# Patient Record
Sex: MALE | Race: WHITE | NOT HISPANIC OR LATINO | Employment: FULL TIME | URBAN - METROPOLITAN AREA
[De-identification: names, ages, dates, MRNs, and addresses within clinical notes are randomized per-mention and may not be internally consistent; named-entity substitution may affect disease eponyms.]

---

## 2024-04-18 ENCOUNTER — OFFICE VISIT (OUTPATIENT)
Dept: URGENT CARE | Facility: CLINIC | Age: 39
End: 2024-04-18
Payer: COMMERCIAL

## 2024-04-18 VITALS
OXYGEN SATURATION: 100 % | SYSTOLIC BLOOD PRESSURE: 138 MMHG | TEMPERATURE: 97.9 F | DIASTOLIC BLOOD PRESSURE: 74 MMHG | HEART RATE: 68 BPM | RESPIRATION RATE: 18 BRPM | WEIGHT: 184.4 LBS

## 2024-04-18 DIAGNOSIS — Z75.8 DOES NOT HAVE PRIMARY CARE PROVIDER: ICD-10-CM

## 2024-04-18 DIAGNOSIS — M79.602 LEFT ARM PAIN: Primary | ICD-10-CM

## 2024-04-18 DIAGNOSIS — Z82.49 FAMILY HISTORY OF CARDIAC DISORDER: ICD-10-CM

## 2024-04-18 DIAGNOSIS — E78.1 HIGH TRIGLYCERIDES: ICD-10-CM

## 2024-04-18 DIAGNOSIS — Z86.79 PERSONAL HISTORY OF PRIMARY HYPERTENSION: ICD-10-CM

## 2024-04-18 DIAGNOSIS — F43.9 STRESS: ICD-10-CM

## 2024-04-18 LAB
ATRIAL RATE: 68 BPM
P AXIS: -24 DEGREES
PR INTERVAL: 150 MS
QRS AXIS: 19 DEGREES
QRSD INTERVAL: 80 MS
QT INTERVAL: 382 MS
QTC INTERVAL: 406 MS
T WAVE AXIS: -3 DEGREES
VENTRICULAR RATE: 68 BPM

## 2024-04-18 PROCEDURE — 99215 OFFICE O/P EST HI 40 MIN: CPT | Performed by: NURSE PRACTITIONER

## 2024-04-18 PROCEDURE — 93010 ELECTROCARDIOGRAM REPORT: CPT | Performed by: NURSE PRACTITIONER

## 2024-04-18 PROCEDURE — 93005 ELECTROCARDIOGRAM TRACING: CPT | Performed by: NURSE PRACTITIONER

## 2024-04-18 NOTE — PATIENT INSTRUCTIONS
You are being sent to the ED for a higher level of care and evaluation as per your concern for cardiac disease related to recent stress and left arm pain with personal hx of high blood pressure and high try glycerides and family cardiac disease   You have been given a referral for PCP - call for appointment after ED visit

## 2024-04-18 NOTE — PROGRESS NOTES
[facility]        NAME: Skyler Brunner is a 38 y.o. male  : 1985    MRN: [id number]  DATE: 2024  TIME: 10:59 AM    Assessment and Plan   Left arm pain [M79.602]  1. Left arm pain  ECG 12 lead    Ambulatory Referral to Family Practice    Transfer to other facility      2. Stress  Ambulatory Referral to Family Practice    Transfer to other facility      3. Family history of cardiac disorder  Ambulatory Referral to Family Practice    Transfer to other facility      4. High triglycerides  Ambulatory Referral to Family Practice    Transfer to other facility    personal history of      5. Personal history of primary hypertension  Ambulatory Referral to Family Practice    Transfer to other facility      6. Does not have primary care provider  Ambulatory Referral to Family Practice    Transfer to other facility            Patient Instructions       Follow up with PCP in 3-5 days.  Proceed to  ER if symptoms worsen.    If tests have been performed at [facility], our office will contact you with results if changes need to be made to the care plan discussed with you at the visit.  You can review your full results on [facility] MyChart.      You are being sent to the ED for a higher level of care and evaluation as per your concern for cardiac disease related to recent stress and left arm pain with personal hx of high blood pressure and high try glycerides and family cardiac disease   You have been given a referral for PCP - call for appointment after ED visit     Chief Complaint     Chief Complaint   Patient presents with    Chest Pain         History of Present Illness       This is a 38 year old male who states that for the last 3 weeks has had intermittent left arm pain that seems to start at the shoulder and radiates to the elbow and feels numb.  He states he was sleeping on his left side when he noticed this.  He denies taking anything for pain. He states he does smoke cannibals in the niya at home.   He denies any radiation of pain into the jaw, neck, back or chest.  He states that he has hx of high blood pressure and high trig that are untreated.  He states that he does have strong family history of cardiac disease but no early demise.  He states that he did take 3 baby asa this morning.  He states that over the last few weeks he has been stressed and 3 days ago his partner resigned so has had worsening stress along with worsening left arm pain over the last 3 days.  He states he does do some lifting at times but doesn't remember or is able to relate that to the arm pain.   He denies having a PCP.  PMH is listed.               Review of Systems   Review of Systems   Constitutional: Negative.    HENT: Negative.     Eyes: Negative.    Respiratory: Negative.     Cardiovascular: Negative.    Gastrointestinal: Negative.    Endocrine: Negative.    Genitourinary: Negative.    Musculoskeletal:         Left arm pain    Skin: Negative.    Allergic/Immunologic: Negative.    Neurological: Negative.    Hematological: Negative.    Psychiatric/Behavioral: Negative.           Current Medications     No current outpatient medications on file.    Current Allergies     Allergies as of 04/18/2024    (Not on File)            The following portions of the patient's history were reviewed and updated as appropriate: allergies, current medications, past family history, past medical history, past social history, past surgical history and problem list.     Past Medical History:   Diagnosis Date    Hypertension        History reviewed. No pertinent surgical history.    History reviewed. No pertinent family history.      Medications have been verified.        Objective   /74   Pulse 68   Temp 97.9 °F (36.6 °C)   Resp 18   Wt 83.6 kg (184 lb 6.4 oz)   SpO2 100%   No LMP for male patient.       Physical Exam     Physical Exam  Vitals and nursing note reviewed.   Constitutional:       General: He is not in acute distress.      Appearance: He is well-developed and normal weight. He is not ill-appearing, toxic-appearing or diaphoretic.   HENT:      Head: Normocephalic and atraumatic.   Eyes:      Extraocular Movements: Extraocular movements intact.   Cardiovascular:      Rate and Rhythm: Normal rate and regular rhythm.      Heart sounds: Normal heart sounds. No murmur heard.  Pulmonary:      Effort: Pulmonary effort is normal. No tachypnea, accessory muscle usage or respiratory distress.      Breath sounds: Normal breath sounds. No stridor.   Chest:      Chest wall: No tenderness.   Musculoskeletal:         General: Normal range of motion.      Cervical back: Normal range of motion and neck supple.      Comments: No TTP at left shoulder or bicep area  FROM.  NVI  Muscle strength 5/5.      Skin:     General: Skin is warm and dry.      Capillary Refill: Capillary refill takes less than 2 seconds.   Neurological:      General: No focal deficit present.      Mental Status: He is alert and oriented to person, place, and time.   Psychiatric:         Mood and Affect: Mood normal.         Behavior: Behavior normal.             EKG NSR with HR 68   QRS 80      No stemi    Discussed with pt that cardiac work up would be warranted due to PMH and family hx.  He agrees.  He does not have a PCP and therefore has no one on an OP basis to complete.   Explained to pt that ED higher level of care and evaluation would be warranted for patient.  He agrees.  ADT 21 for [place] placed  Referral for PCP to pt

## 2024-05-02 ENCOUNTER — LAB (OUTPATIENT)
Dept: LAB | Facility: MEDICAL CENTER | Age: 39
End: 2024-05-02
Payer: COMMERCIAL

## 2024-05-02 ENCOUNTER — OFFICE VISIT (OUTPATIENT)
Dept: FAMILY MEDICINE CLINIC | Facility: CLINIC | Age: 39
End: 2024-05-02
Payer: COMMERCIAL

## 2024-05-02 VITALS
BODY MASS INDEX: 25.91 KG/M2 | TEMPERATURE: 97.7 F | WEIGHT: 181 LBS | DIASTOLIC BLOOD PRESSURE: 82 MMHG | HEIGHT: 70 IN | HEART RATE: 70 BPM | RESPIRATION RATE: 18 BRPM | OXYGEN SATURATION: 98 % | SYSTOLIC BLOOD PRESSURE: 128 MMHG

## 2024-05-02 DIAGNOSIS — D17.9 LIPOMA, UNSPECIFIED SITE: ICD-10-CM

## 2024-05-02 DIAGNOSIS — Z13.89 SCREENING FOR MULTIPLE CONDITIONS: ICD-10-CM

## 2024-05-02 DIAGNOSIS — S46.912A STRAIN OF LEFT SHOULDER, INITIAL ENCOUNTER: Primary | ICD-10-CM

## 2024-05-02 DIAGNOSIS — Z78.9 PESCETARIAN: ICD-10-CM

## 2024-05-02 DIAGNOSIS — Z13.79 GENETIC TESTING: Primary | ICD-10-CM

## 2024-05-02 PROBLEM — F12.90 MARIJUANA USE: Status: ACTIVE | Noted: 2024-05-02

## 2024-05-02 LAB
ALBUMIN SERPL BCP-MCNC: 4.7 G/DL (ref 3.5–5)
ALP SERPL-CCNC: 56 U/L (ref 34–104)
ALT SERPL W P-5'-P-CCNC: 15 U/L (ref 7–52)
ANION GAP SERPL CALCULATED.3IONS-SCNC: 9 MMOL/L (ref 4–13)
AST SERPL W P-5'-P-CCNC: 18 U/L (ref 13–39)
BILIRUB SERPL-MCNC: 1.39 MG/DL (ref 0.2–1)
BUN SERPL-MCNC: 14 MG/DL (ref 5–25)
CALCIUM SERPL-MCNC: 9.6 MG/DL (ref 8.4–10.2)
CHLORIDE SERPL-SCNC: 102 MMOL/L (ref 96–108)
CHOLEST SERPL-MCNC: 149 MG/DL
CO2 SERPL-SCNC: 27 MMOL/L (ref 21–32)
CREAT SERPL-MCNC: 0.89 MG/DL (ref 0.6–1.3)
ERYTHROCYTE [DISTWIDTH] IN BLOOD BY AUTOMATED COUNT: 12.1 % (ref 11.6–15.1)
GFR SERPL CREATININE-BSD FRML MDRD: 109 ML/MIN/1.73SQ M
GLUCOSE P FAST SERPL-MCNC: 89 MG/DL (ref 65–99)
HCT VFR BLD AUTO: 47.8 % (ref 36.5–49.3)
HDLC SERPL-MCNC: 38 MG/DL
HGB BLD-MCNC: 15.9 G/DL (ref 12–17)
LDLC SERPL CALC-MCNC: 77 MG/DL (ref 0–100)
MCH RBC QN AUTO: 29 PG (ref 26.8–34.3)
MCHC RBC AUTO-ENTMCNC: 33.3 G/DL (ref 31.4–37.4)
MCV RBC AUTO: 87 FL (ref 82–98)
PLATELET # BLD AUTO: 249 THOUSANDS/UL (ref 149–390)
PMV BLD AUTO: 10.4 FL (ref 8.9–12.7)
POTASSIUM SERPL-SCNC: 4.2 MMOL/L (ref 3.5–5.3)
PROT SERPL-MCNC: 7.5 G/DL (ref 6.4–8.4)
RBC # BLD AUTO: 5.48 MILLION/UL (ref 3.88–5.62)
SODIUM SERPL-SCNC: 138 MMOL/L (ref 135–147)
TRIGL SERPL-MCNC: 169 MG/DL
WBC # BLD AUTO: 5.87 THOUSAND/UL (ref 4.31–10.16)

## 2024-05-02 PROCEDURE — 3725F SCREEN DEPRESSION PERFORMED: CPT | Performed by: PHYSICIAN ASSISTANT

## 2024-05-02 PROCEDURE — 80061 LIPID PANEL: CPT

## 2024-05-02 PROCEDURE — 85027 COMPLETE CBC AUTOMATED: CPT

## 2024-05-02 PROCEDURE — 99204 OFFICE O/P NEW MOD 45 MIN: CPT | Performed by: PHYSICIAN ASSISTANT

## 2024-05-02 PROCEDURE — 80053 COMPREHEN METABOLIC PANEL: CPT

## 2024-05-02 PROCEDURE — 36415 COLL VENOUS BLD VENIPUNCTURE: CPT

## 2024-05-02 NOTE — ASSESSMENT & PLAN NOTE
Recommend rest, do not weight lift. OK to do yoga.  Offered physical therapy, he declines at this time.  Try OTC nsaids for pain/inflammation relief.

## 2024-05-02 NOTE — ASSESSMENT & PLAN NOTE
Pt with one on R antecubital area as well as L abdominal area x years. Declines referral to general surgeon for removal at this time.

## 2024-05-02 NOTE — PROGRESS NOTES
Name: Skyler Brunner      : 1985      MRN: [id number]  Encounter Provider: Perri Redd PA-C  Encounter Date: 2024   Encounter department: [facility]    Assessment & Plan     1. Strain of left shoulder, initial encounter  Assessment & Plan:  Recommend rest, do not weight lift. OK to do yoga.  Offered physical therapy, he declines at this time.  Try OTC nsaids for pain/inflammation relief.      2. Lipoma, unspecified site  Assessment & Plan:  Pt with one on R antecubital area as well as L abdominal area x years. Declines referral to general surgeon for removal at this time.      3. Pescetarian    4. Screening for multiple conditions  -     CBC; Future  -     Comprehensive metabolic panel; Future  -     Lipid Panel with Direct LDL reflex; Future        Depression Screening and Follow-up Plan: Patient was screened for depression during today's encounter. They screened negative with a PHQ-2 score of 0.        Subjective      Skyler is here today to establish care.  Complaining of some pain in L shoulder x 3 weeks, injured it while angrily shoveling stone across his body. Feels as if there is a tendon or band that is sore. He has not tried PT or any OTC anti-inflammatory medication for injury.  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  He would like routine screening labs.  Ordered a methylation kit online, he will complete this on his own, states there are counselors available to discuss any abnormalities that may be found.  He also notes has some fatty growths x years, not changing in size by much, located L abdomen and R antecubital area, both approximately grape-golfball in size.      Review of Systems   Constitutional:  Negative for chills and fever.   HENT:  Negative for ear pain and sore throat.    Eyes:  Negative for pain and visual disturbance.   Respiratory:  Negative for cough and shortness of breath.    Cardiovascular:  Negative for chest pain  "and palpitations.   Gastrointestinal:  Negative for abdominal pain and vomiting.   Genitourinary:  Negative for dysuria and hematuria.   Musculoskeletal:  Positive for arthralgias (L shoulder). Negative for back pain.   Skin:  Negative for color change and rash.   Neurological:  Negative for seizures and syncope.   All other systems reviewed and are negative.      No current outpatient medications on file prior to visit.       Objective     /82 (BP Location: Left arm, Patient Position: Sitting, Cuff Size: Adult)   Pulse 70   Temp 97.7 °F (36.5 °C) (Temporal)   Resp 18   Ht 5' 10\" (1.778 m)   Wt 82.1 kg (181 lb)   SpO2 98%   BMI 25.97 kg/m²     Physical Exam  Vitals reviewed.   Constitutional:       General: He is not in acute distress.     Appearance: He is well-developed. He is not diaphoretic.   HENT:      Head: Normocephalic and atraumatic.      Right Ear: Hearing, tympanic membrane, ear canal and external ear normal.      Left Ear: Hearing, tympanic membrane, ear canal and external ear normal.      Nose: Nose normal.      Mouth/Throat:      Mouth: Mucous membranes are moist.      Pharynx: Oropharynx is clear. Uvula midline. No oropharyngeal exudate.   Eyes:      General: No scleral icterus.        Right eye: No discharge.         Left eye: No discharge.      Conjunctiva/sclera: Conjunctivae normal.   Neck:      Thyroid: No thyromegaly.      Vascular: No carotid bruit.   Cardiovascular:      Rate and Rhythm: Normal rate and regular rhythm.      Heart sounds: Normal heart sounds. No murmur heard.  Pulmonary:      Effort: Pulmonary effort is normal. No respiratory distress.      Breath sounds: Normal breath sounds. No wheezing.   Abdominal:      General: Bowel sounds are normal. There is no distension.      Palpations: Abdomen is soft. There is no mass.      Tenderness: There is no abdominal tenderness. There is no guarding or rebound.   Musculoskeletal:         General: Normal range of motion.      " Left shoulder: Tenderness present.        Arms:       Cervical back: Neck supple.   Lymphadenopathy:      Cervical: No cervical adenopathy.   Skin:     General: Skin is warm and dry.      Findings: No erythema or rash.             Comments: lipomas   Neurological:      Mental Status: He is alert and oriented to person, place, and time.   Psychiatric:         Behavior: Behavior normal.         Thought Content: Thought content normal.         Judgment: Judgment normal.       Perri Redd PA-C

## 2024-05-03 DIAGNOSIS — E78.1 HYPERTRIGLYCERIDEMIA: Primary | ICD-10-CM

## 2024-07-07 ENCOUNTER — OFFICE VISIT (OUTPATIENT)
Dept: URGENT CARE | Facility: CLINIC | Age: 39
End: 2024-07-07
Payer: COMMERCIAL

## 2024-07-07 VITALS
TEMPERATURE: 98.4 F | SYSTOLIC BLOOD PRESSURE: 131 MMHG | HEART RATE: 75 BPM | WEIGHT: 183.8 LBS | OXYGEN SATURATION: 97 % | DIASTOLIC BLOOD PRESSURE: 82 MMHG | BODY MASS INDEX: 27.22 KG/M2 | RESPIRATION RATE: 20 BRPM | HEIGHT: 69 IN

## 2024-07-07 DIAGNOSIS — L25.5 DERMATITIS DUE TO PLANT: ICD-10-CM

## 2024-07-07 DIAGNOSIS — J02.9 SORE THROAT: Primary | ICD-10-CM

## 2024-07-07 LAB — S PYO AG THROAT QL: NEGATIVE

## 2024-07-07 PROCEDURE — 87880 STREP A ASSAY W/OPTIC: CPT | Performed by: PHYSICIAN ASSISTANT

## 2024-07-07 PROCEDURE — 99213 OFFICE O/P EST LOW 20 MIN: CPT | Performed by: PHYSICIAN ASSISTANT

## 2024-07-07 RX ORDER — PREDNISONE 20 MG/1
40 TABLET ORAL DAILY
Qty: 8 TABLET | Refills: 0 | Status: SHIPPED | OUTPATIENT
Start: 2024-07-07 | End: 2024-07-11

## 2024-07-07 RX ORDER — TRIAMCINOLONE ACETONIDE 1 MG/G
CREAM TOPICAL 2 TIMES DAILY
Qty: 80 G | Refills: 0 | Status: SHIPPED | OUTPATIENT
Start: 2024-07-07

## 2024-07-07 RX ORDER — FLUTICASONE PROPIONATE 50 MCG
1 SPRAY, SUSPENSION (ML) NASAL DAILY
Qty: 18.2 ML | Refills: 0 | Status: SHIPPED | OUTPATIENT
Start: 2024-07-07

## 2024-07-07 NOTE — PATIENT INSTRUCTIONS
Rapid strep negative.  Discussed sore throat most likely related to postnasal drip, will treat with Flonase.  Will treat dermatitis with course of prednisone and Kenalog.  All patient's questions answered.

## 2024-07-07 NOTE — PROGRESS NOTES
[facility]        NAME: Skyler Brunner is a 38 y.o. male  : 1985    MRN: [id number]  DATE: 2024  TIME: 3:34 PM    Assessment and Plan   Sore throat [J02.9]  1. Sore throat  POCT rapid ANTIGEN strepA    predniSONE 20 mg tablet    fluticasone (FLONASE) 50 mcg/act nasal spray      2. Dermatitis due to plant  predniSONE 20 mg tablet    triamcinolone (KENALOG) 0.1 % cream            Patient Instructions     Patient Instructions   Rapid strep negative.  Discussed sore throat most likely related to postnasal drip, will treat with Flonase.  Will treat dermatitis with course of prednisone and Kenalog.  All patient's questions answered.      Follow up with PCP in 3-5 days.  Proceed to  ER if symptoms worsen.    Chief Complaint     Chief Complaint   Patient presents with    Sore Throat     For 2 weeks, now moderate pain. Wants strep test. Recent +covid    Poison Ivy     In bilateral arms, not going away- for 2 weeks.          History of Present Illness       Patient is a 38-year-old male presenting today with sore throat x 2 weeks.  Patient notes he was diagnosed with COVID about 2 weeks ago, states that he had flulike symptoms, has had residual congestion, postnasal drip and sore throat, has tried a couple different over-the-counter cough and cold medications with no real relief.  Denies fever, trouble swallowing, voice change, drooling.    Patient also notes he has had a rash on his forearms for the last few days, believes it to be poison ivy as he was doing some yard work before getting it.  Has been applying some calamine lotion and taking Benadryl which has provided some relief.  Denies fever, chills, chest tightness, SOB.        Review of Systems   Review of Systems   Constitutional:  Negative for chills and fever.   HENT:  Positive for congestion, postnasal drip and sore throat. Negative for ear pain.    Eyes:  Negative for pain and visual disturbance.   Respiratory:  Negative for cough and  "shortness of breath.    Cardiovascular:  Negative for chest pain and palpitations.   Gastrointestinal:  Negative for abdominal pain and vomiting.   Genitourinary:  Negative for dysuria and hematuria.   Musculoskeletal:  Negative for arthralgias and back pain.   Skin:  Positive for rash.   Neurological:  Negative for seizures and syncope.   All other systems reviewed and are negative.        Current Medications       Current Outpatient Medications:     fluticasone (FLONASE) 50 mcg/act nasal spray, 1 spray into each nostril daily, Disp: 18.2 mL, Rfl: 0    predniSONE 20 mg tablet, Take 2 tablets (40 mg total) by mouth daily for 4 days, Disp: 8 tablet, Rfl: 0    triamcinolone (KENALOG) 0.1 % cream, Apply topically 2 (two) times a day, Disp: 80 g, Rfl: 0    Current Allergies     Allergies as of 07/07/2024    (No Known Allergies)            The following portions of the patient's history were reviewed and updated as appropriate: allergies, current medications, past family history, past medical history, past social history, past surgical history and problem list.     Past Medical History:   Diagnosis Date    Hypertension        Past Surgical History:   Procedure Laterality Date    TONSILECTOMY AND ADNOIDECTOMY      WISDOM TOOTH EXTRACTION         Family History   Problem Relation Age of Onset    Thyroid disease Mother     No Known Problems Father     Stroke Paternal Grandfather     Stroke Paternal Uncle          Medications have been verified.        Objective   /82   Pulse 75   Temp 98.4 °F (36.9 °C)   Resp 20   Ht 5' 9\" (1.753 m)   Wt 83.4 kg (183 lb 12.8 oz)   SpO2 97%   BMI 27.14 kg/m²        Physical Exam     Physical Exam  Vitals and nursing note reviewed.   Constitutional:       General: He is not in acute distress.     Appearance: He is well-developed. He is not toxic-appearing.   HENT:      Head: Normocephalic and atraumatic.      Right Ear: Tympanic membrane, ear canal and external ear normal.      " Left Ear: Tympanic membrane, ear canal and external ear normal.      Nose: Congestion present.      Mouth/Throat:      Mouth: Mucous membranes are moist.      Pharynx: Oropharynx is clear.   Eyes:      Conjunctiva/sclera: Conjunctivae normal.   Cardiovascular:      Rate and Rhythm: Normal rate and regular rhythm.      Pulses: Normal pulses.      Heart sounds: Normal heart sounds.   Pulmonary:      Effort: Pulmonary effort is normal.      Breath sounds: Normal breath sounds.   Musculoskeletal:      Cervical back: Normal range of motion. No tenderness.   Lymphadenopathy:      Cervical: No cervical adenopathy.   Skin:     Capillary Refill: Capillary refill takes less than 2 seconds.      Findings: Rash present.             Comments: Red papular vesicular rash of forearms bilaterally, presentation consistent with dermatitis, no signs of infection   Neurological:      General: No focal deficit present.      Mental Status: He is alert and oriented to person, place, and time.

## 2025-08-01 ENCOUNTER — OFFICE VISIT (OUTPATIENT)
Dept: URGENT CARE | Facility: MEDICAL CENTER | Age: 40
End: 2025-08-01
Payer: COMMERCIAL

## 2025-08-01 VITALS
RESPIRATION RATE: 20 BRPM | BODY MASS INDEX: 27.85 KG/M2 | TEMPERATURE: 98.8 F | SYSTOLIC BLOOD PRESSURE: 116 MMHG | HEART RATE: 66 BPM | OXYGEN SATURATION: 99 % | HEIGHT: 69 IN | DIASTOLIC BLOOD PRESSURE: 69 MMHG | WEIGHT: 188 LBS

## 2025-08-01 DIAGNOSIS — J06.9 VIRAL URI WITH COUGH: Primary | ICD-10-CM

## 2025-08-01 PROCEDURE — 99213 OFFICE O/P EST LOW 20 MIN: CPT

## 2025-08-01 RX ORDER — METHYLPREDNISOLONE 4 MG/1
TABLET ORAL
Qty: 1 EACH | Refills: 0 | Status: SHIPPED | OUTPATIENT
Start: 2025-08-01

## 2025-08-01 RX ORDER — BROMPHENIRAMINE MALEATE, PSEUDOEPHEDRINE HYDROCHLORIDE, AND DEXTROMETHORPHAN HYDROBROMIDE 2; 30; 10 MG/5ML; MG/5ML; MG/5ML
5 SYRUP ORAL 4 TIMES DAILY PRN
Qty: 120 ML | Refills: 0 | Status: SHIPPED | OUTPATIENT
Start: 2025-08-01

## 2025-08-22 ENCOUNTER — OFFICE VISIT (OUTPATIENT)
Dept: URGENT CARE | Facility: CLINIC | Age: 40
End: 2025-08-22
Payer: COMMERCIAL

## 2025-08-22 VITALS
DIASTOLIC BLOOD PRESSURE: 82 MMHG | TEMPERATURE: 98 F | SYSTOLIC BLOOD PRESSURE: 115 MMHG | RESPIRATION RATE: 20 BRPM | HEART RATE: 78 BPM | BODY MASS INDEX: 27.85 KG/M2 | OXYGEN SATURATION: 98 % | HEIGHT: 69 IN | WEIGHT: 188 LBS

## 2025-08-22 DIAGNOSIS — J02.9 ACUTE PHARYNGITIS, UNSPECIFIED ETIOLOGY: Primary | ICD-10-CM

## 2025-08-22 DIAGNOSIS — J02.9 SORE THROAT: ICD-10-CM

## 2025-08-22 LAB — S PYO AG THROAT QL: NEGATIVE

## 2025-08-22 PROCEDURE — 99213 OFFICE O/P EST LOW 20 MIN: CPT

## 2025-08-22 PROCEDURE — 87880 STREP A ASSAY W/OPTIC: CPT

## 2025-08-22 PROCEDURE — 87070 CULTURE OTHR SPECIMN AEROBIC: CPT

## 2025-08-22 RX ORDER — AMOXICILLIN 500 MG/1
500 CAPSULE ORAL EVERY 12 HOURS SCHEDULED
Qty: 20 CAPSULE | Refills: 0 | Status: SHIPPED | OUTPATIENT
Start: 2025-08-22 | End: 2025-09-01

## 2025-08-24 LAB — BACTERIA THROAT CULT: NORMAL
